# Patient Record
Sex: MALE | Race: WHITE | NOT HISPANIC OR LATINO | Employment: OTHER | URBAN - METROPOLITAN AREA
[De-identification: names, ages, dates, MRNs, and addresses within clinical notes are randomized per-mention and may not be internally consistent; named-entity substitution may affect disease eponyms.]

---

## 2021-02-11 ENCOUNTER — OFFICE VISIT (OUTPATIENT)
Dept: GASTROENTEROLOGY | Facility: CLINIC | Age: 86
End: 2021-02-11
Payer: MEDICARE

## 2021-02-11 VITALS
DIASTOLIC BLOOD PRESSURE: 80 MMHG | SYSTOLIC BLOOD PRESSURE: 148 MMHG | WEIGHT: 179.8 LBS | HEART RATE: 62 BPM | BODY MASS INDEX: 22.36 KG/M2 | HEIGHT: 75 IN

## 2021-02-11 DIAGNOSIS — K62.5 RECTAL BLEEDING: Primary | ICD-10-CM

## 2021-02-11 PROCEDURE — 99204 OFFICE O/P NEW MOD 45 MIN: CPT | Performed by: INTERNAL MEDICINE

## 2021-02-11 RX ORDER — TRIAMCINOLONE ACETONIDE 1 MG/G
1 CREAM TOPICAL 2 TIMES DAILY
COMMUNITY
Start: 2021-02-03

## 2021-02-11 RX ORDER — LOSARTAN POTASSIUM 100 MG/1
100 TABLET ORAL DAILY
COMMUNITY
Start: 2021-01-18

## 2021-02-11 RX ORDER — ATORVASTATIN CALCIUM 10 MG/1
10 TABLET, FILM COATED ORAL EVERY MORNING
COMMUNITY
Start: 2021-01-09

## 2021-02-11 RX ORDER — HYDROCHLOROTHIAZIDE 12.5 MG/1
12.5 CAPSULE, GELATIN COATED ORAL DAILY
COMMUNITY
Start: 2020-11-20

## 2021-02-11 RX ORDER — ATENOLOL 25 MG/1
25 TABLET ORAL EVERY MORNING
COMMUNITY
Start: 2020-12-30

## 2021-02-11 RX ORDER — RIVAROXABAN 20 MG/1
20 TABLET, FILM COATED ORAL DAILY
COMMUNITY
Start: 2021-01-13

## 2021-02-12 NOTE — PROGRESS NOTES
[facility] - Outpatient Consultation  Malia Lloyd 82 y o  male MRN: [id number]  Encounter: [id number]          ASSESSMENT AND PLAN:      1  Rectal bleeding    after comprehensive review of the patient's subjective complaints and previous colonoscopy he has, there appears to be no medical necessity for performing a colonoscopy at this time  Patient was advised to continue 10 you not using a wipes in the perianal area since I believe that this was, in fact, contributing to the patient's perianal irritation and recurring rectal bleeding  Patient was advised that if he continues to have rectal bleeding despite not wiping, then I will reconsider possible colonoscopy    ______________________________________________________________________    HPI:  This 68-year-old male who I have known for a long time presents to the office today stating that he was experiencing some rectal bleeding up until about 2 weeks ago  He stated that he had been on a course of antibiotics for several days and subsequent to that he was experiencing diarrhea  He started using a cleansing wipes in the perianal area and then began experiencing bright red rectal bleeding  He is currently now on a probiotic  He states that once he stops using the wipes, the rectal bleeding stop  He denies any abdominal pain, constipation  He had undergone a colonoscopy in December 29, 2006 which showed diverticulosis coli  He underwent colonoscopy on February 28, 2012 which showed diverticulosis coli  He underwent a colonoscopy on April 22, 2017 which showed diverticulosis coli  He has undergone a video capsule endoscopy on April 27, 2017 which was negative and unremarkable  His hemoglobin level available from October 27, 2020 which showed a value of 15 1 grams/deciliter  REVIEW OF SYSTEMS:    CONSTITUTIONAL: Denies any fever, chills, rigors, and weight loss  HEENT: No earache or tinnitus   Denies hearing loss or visual disturbances  CARDIOVASCULAR: No chest pain or palpitations  RESPIRATORY: Denies any cough, hemoptysis, shortness of breath or dyspnea on exertion  GASTROINTESTINAL: As noted in the History of Present Illness  GENITOURINARY: No problems with urination  Denies any hematuria or dysuria  NEUROLOGIC: No dizziness or vertigo, denies headaches  MUSCULOSKELETAL: Denies any muscle or joint pain  SKIN: Denies skin rashes or itching  ENDOCRINE: Denies excessive thirst  Denies intolerance to heat or cold  PSYCHOSOCIAL: Denies depression or anxiety  Denies any recent memory loss  Historical Information   Past Medical History:   Diagnosis Date    Abdominal fibromatosis      Past Surgical History:   Procedure Laterality Date    CARDIAC PACEMAKER PLACEMENT       Social History   Social History     Substance and Sexual Activity   Alcohol Use Yes     Social History     Substance and Sexual Activity   Drug Use Never     Social History     Tobacco Use   Smoking Status Former Smoker   Smokeless Tobacco Never Used     No family history on file  Meds/Allergies       Current Outpatient Medications:     atenolol (TENORMIN) 25 mg tablet    atorvastatin (LIPITOR) 10 mg tablet    hydrochlorothiazide (MICROZIDE) 12 5 mg capsule    losartan (COZAAR) 100 MG tablet    triamcinolone (KENALOG) 0 1 % cream    Xarelto 20 MG tablet    No Known Allergies        Objective     Blood pressure 148/80, pulse 62, height 6' 3" (1 905 m), weight 81 6 kg (179 lb 12 8 oz)  Body mass index is 22 47 kg/m²  PHYSICAL EXAM:      General Appearance:   Alert, cooperative, no distress   HEENT:   Normocephalic, atraumatic, anicteric      Neck:  Supple, symmetrical, trachea midline   Lungs:   Clear to auscultation bilaterally; no rales, rhonchi or wheezing; respirations unlabored    Heart[de-identified]   Regular rate and rhythm; no murmur, rub, or gallop     Abdomen:   Soft, non-tender, non-distended; normal bowel sounds; no masses, no organomegaly    Genitalia:   Deferred    Rectal:   Deferred    Extremities:  No cyanosis, clubbing or edema    Pulses:  2+ and symmetric    Skin:  No jaundice, rashes, or lesions    Lymph nodes:  No palpable cervical lymphadenopathy        Lab Results:   No visits with results within 1 Day(s) from this visit  Latest known visit with results is:   No results found for any previous visit  Radiology Results:   No results found

## 2021-03-25 ENCOUNTER — TRANSCRIBE ORDERS (OUTPATIENT)
Dept: ADMINISTRATIVE | Facility: HOSPITAL | Age: 86
End: 2021-03-25

## 2021-03-25 DIAGNOSIS — G25.0 BENIGN ESSENTIAL TREMOR: Primary | ICD-10-CM
